# Patient Record
Sex: FEMALE | Race: OTHER
[De-identification: names, ages, dates, MRNs, and addresses within clinical notes are randomized per-mention and may not be internally consistent; named-entity substitution may affect disease eponyms.]

---

## 2023-08-06 ENCOUNTER — HOSPITAL ENCOUNTER (EMERGENCY)
Dept: HOSPITAL 65 - ER | Age: 48
Discharge: HOME | End: 2023-08-06
Payer: COMMERCIAL

## 2023-08-06 VITALS
TEMPERATURE: 99 F | HEIGHT: 66 IN | BODY MASS INDEX: 19.29 KG/M2 | WEIGHT: 120 LBS | SYSTOLIC BLOOD PRESSURE: 113 MMHG | RESPIRATION RATE: 18 BRPM | OXYGEN SATURATION: 97 % | HEART RATE: 83 BPM | DIASTOLIC BLOOD PRESSURE: 76 MMHG

## 2023-08-06 VITALS
SYSTOLIC BLOOD PRESSURE: 119 MMHG | OXYGEN SATURATION: 97 % | TEMPERATURE: 99 F | DIASTOLIC BLOOD PRESSURE: 75 MMHG | RESPIRATION RATE: 18 BRPM | HEART RATE: 77 BPM

## 2023-08-06 VITALS
TEMPERATURE: 99 F | OXYGEN SATURATION: 97 % | SYSTOLIC BLOOD PRESSURE: 104 MMHG | HEART RATE: 65 BPM | DIASTOLIC BLOOD PRESSURE: 69 MMHG | RESPIRATION RATE: 18 BRPM

## 2023-08-06 DIAGNOSIS — F41.9: ICD-10-CM

## 2023-08-06 DIAGNOSIS — N39.0: Primary | ICD-10-CM

## 2023-08-06 DIAGNOSIS — F17.210: ICD-10-CM

## 2023-08-06 DIAGNOSIS — E86.0: ICD-10-CM

## 2023-08-06 DIAGNOSIS — Z98.890: ICD-10-CM

## 2023-08-06 LAB
BASOPHILS # BLD AUTO: 0 10^3/UL (ref 0–0.1)
BASOPHILS NFR BLD AUTO: 0.7 % (ref 0–0.2)
BILIRUB UR STRIP.AUTO-MCNC: NEGATIVE MG/DL
CO2 BLDA-SCNC: 26.4 MMOL/L (ref 20–32)
EOSINOPHIL # BLD AUTO: 0 10^3/UL (ref 0–0.2)
EOSINOPHIL NFR BLD AUTO: 0.2 % (ref 0–5)
ERYTHROCYTE [DISTWIDTH] IN BLOOD BY AUTOMATED COUNT: 12.9 % (ref 11.5–14.5)
GLUCOSE PRE 100 G GLC PO SERPL-MCNC: 103 MG/DL (ref 74–106)
LYMPHOCYTES # BLD AUTO: 1.33 10^3/UL1 (ref 1–4.8)
LYMPHOCYTES NFR BLD AUTO: 22.8 % (ref 24–44)
MCH RBC QN AUTO: 28.9 PG (ref 26–34)
MONOCYTES # BLD AUTO: 0.4 10^3/UL (ref 0.3–0.8)
MONOCYTES NFR BLD AUTO: 7.2 % (ref 5–12)
NEUTROPHILS # BLD AUTO: 4 10^3/UL (ref 1.8–7.7)
NEUTROPHILS NFR BLD AUTO: 68.9 % (ref 41–85)
PLATELET # BLD AUTO: 248 10^3/UL (ref 150–400)
UROBILINOGEN UR QL STRIP.AUTO: 0.2 E.U./DL
YEAST URNS QL MICRO: (no result)

## 2023-08-06 PROCEDURE — 84703 CHORIONIC GONADOTROPIN ASSAY: CPT

## 2023-08-06 PROCEDURE — 96360 HYDRATION IV INFUSION INIT: CPT

## 2023-08-06 PROCEDURE — 99283 EMERGENCY DEPT VISIT LOW MDM: CPT

## 2023-08-06 PROCEDURE — 87077 CULTURE AEROBIC IDENTIFY: CPT

## 2023-08-06 PROCEDURE — 81001 URINALYSIS AUTO W/SCOPE: CPT

## 2023-08-06 PROCEDURE — 87086 URINE CULTURE/COLONY COUNT: CPT

## 2023-08-06 PROCEDURE — 87186 SC STD MICRODIL/AGAR DIL: CPT

## 2023-08-06 PROCEDURE — 36415 COLL VENOUS BLD VENIPUNCTURE: CPT

## 2023-08-06 PROCEDURE — 85730 THROMBOPLASTIN TIME PARTIAL: CPT

## 2023-08-06 PROCEDURE — 85610 PROTHROMBIN TIME: CPT

## 2023-08-06 PROCEDURE — 85025 COMPLETE CBC W/AUTO DIFF WBC: CPT

## 2023-08-06 PROCEDURE — 80053 COMPREHEN METABOLIC PANEL: CPT

## 2023-08-06 PROCEDURE — 85379 FIBRIN DEGRADATION QUANT: CPT

## 2023-08-06 NOTE — ER.PDOC
General


Chief Complaint:  Requesting Medical Care


Stated Complaint:  Weakness


TRAVEL OUT OF US:  No


Time seen by MD:  12:08


Source:  patient, family


Exam Limitations:  no limitations





History of Present Illness


Initial Comments


47 yo F denies particular medical problems or medical history other than 

smoking, anxiety/depression, and prior Caesarian section. Has has insomnia and 

weakness for the past 2-3 days, also poor po intake, while [place]

[place] with her three children ranging from adolescent to pre-K to visit her 

 working in [place] (they live in [place]) and began this 

morning to have some bilateral leg and hand weakness - denying chest pain or 

hyperventilation/dyspnea at the time - which has since resolved. Stopped 

vehicle, called for EMS, comes in [facility] with no obvious deficits.


Timing/Duration:  other (unclear)


Severity:  mild, moderate


Associated Symptoms:  malaise, weakness


Allergies:  


Coded Allergies:  


     No Known Allergies (Unverified , 8/6/23)





Past Medical History


Medical History:  other (anxiety/depression)





Family History


Significant Family History:  no pertinent family hx





Social History


Smoking:  cigarettes


Alcohol Use:  rarely


Drug Use:  none





Reviewed


Nursing Reviewed:  Vital Signs, Abn. Noted, Nursing Assessment





Review of Systems


Constitutional:  malaise, weakness


EENTM:  no symptoms reported


Respiratory:  no symptoms reported; denies shortness of breath


Cardiovascular:  denies chest pain, denies palpitations, denies syncope


Gastrointestinal:  denies abdominal pain, denies diarrhea, denies nausea, denies

vomiting; other (decreased appetite)


Genitourinary:  no symptoms reported


Musculoskeletal:  no symptoms reported


Skin:  no symptoms reported


Psychiatric/Neurological:  anxiety, depressed, tingling (or others)


All Other Systems:  Reviewed and Negative





Physical Exam


General Appearance:  No Apparent Distress, Thin


EENT:  eyes nml inspection


Neck:  Supple


Respiratory:  normal breath sounds


CVS:  reg rate & rhythm


Gastrointestinal:  Normal Bowel Sounds, Non Tender


Extremities:  Normal Range of Motion, Non-Tender


Neurologic/Psychiatric:  No Motor/Sensory Deficits, Alert, Normal Mood/Affect, 

Oriented x 3


Skin:  Normal Color





Results/Orders


Results/Orders





Orders - KRYSTYNA QUINTERO MD


Cbc With Auto Diff (8/6/23 12:08)


Comprehensive Metabolic Panel (8/6/23 12:08)


Urinalysis (8/6/23 12:08)


PT (8/6/23 12:39)


Partial Thromboplastin Time. (8/6/23 12:39)


D-Dimer (8/6/23 12:39)


Hcg Qualitative Serum (8/6/23 12:59)


Urine Culture (8/6/23 12:45)


Potassium Chloride (Klor-Con 10) (8/6/23 13:30)


Sulfamethoxazole/Trimethoprim (Bactrim D (8/6/23 13:09)


0.9 % Sodium Chloride (Ns 1000ml) (8/6/23 13:10)


Potassium Chloride (Klor-Con 10) (8/6/23 13:13)


Sulfamethoxazole/Trimethoprim (Bactrim D (8/6/23 13:13)





Vital Signs








  Date Time  Temp Pulse Resp B/P (MAP) Pulse Ox O2 Delivery O2 Flow Rate FiO2


 


8/6/23 12:49 99.0 77 18 119/75 (90) 97 Room Air* 0 21


 


8/6/23 11:50 99.0 83 18  97   


 


8/6/23 11:50 99.0 83 18 113/76 (88) 97 Room Air* 0 21


 


8/6/23 11:50 99.0 83 18     








                                Laboratory Tests








Test


 8/6/23


12:19 8/6/23


12:45


 


White Blood Count


 5.8 10^3/uL


(4.5-11.0) 





 


Red Blood Count


 4.50 10^6/uL


(4.00-5.20) 





 


Hemoglobin


 13.0 g/dL


(12.0-15.0) 





 


Hematocrit


 39.9 %


(36.0-46.0) 





 


Mean Corpuscular Volume


 88.7 fL


() 





 


Mean Corpuscular Hemoglobin


 28.9 pg


(26-34) 





 


Mean Corpuscular Hemoglobin


Concent 32.6 g/dL


(33-36.5)  L 





 


Red Cell Distribution Width


 12.9 %


(11.5-14.5) 





 


Platelet Count


 248 10^3/uL


(150-400) 





 


Mean Platelet Volume


 10.0 fL


(7.8-11.0) 





 


Neutrophils (%) (Auto)


 68.9 %


(41.0-85.0) 





 


Lymphocytes (%) (Auto)


 22.8 %


(24.0-44.0)  L 





 


Monocytes (%) (Auto)


 7.2 %


(5.0-12.0) 





 


Neutrophils # (Auto)


 4.0 10^3/uL


(1.8-7.7) 





 


Lymphocytes # (Auto)


 1.33 10^3/uL1


(1.0-4.8) 





 


Monocytes # (Auto)


 0.4 10^3/uL


(0.3-0.8) 





 


Absolute Immature Granulocyte


(auto 0.01 10^3 u/L


(0-2) 





 


Absolute Eosinophils (auto)


 0.0 10^3/uL


(0.0-0.2) 





 


Immature Granulocytes %


 0.20 %


(0.00-0.50) 





 


Eosinophils %


 0.2 %


(0.0-5.0) 





 


Basophils %


 0.7 %


(0.0-0.2)  H 





 


Basophils #


 0.0 10^3/uL


(0.0-0.1) 





 


Prothrombin Time


 10.6 SEC


(9.7-11.6) 





 


INR 1.0   


 


Activated Partial


Thromboplast Time 25.0 SEC


(22.5-33.1) 





 


D-Dimer


 0.40 mg/L


(0.19-0.49) 





 


Sodium Level


 141 mmol/L


(132-145) 





 


Potassium Level


 3.2 mmol/L


(3.6-5.2)  L 





 


Chloride Level


 102.0 mmol/L


() 





 


Carbon Dioxide Level


 26.4 mmol/L


(20.0-32) 





 


Anion Gap 15.8   


 


Blood Urea Nitrogen


 4 mg/dL (7-18)


L 





 


Creatinine


 0.70 mg/dL


(0.59-1.40) 





 


Estimated GFR (


American) 109.0 (>/=60)  


 





 


Est GFR (CKD-EPI)(Non-Afr


American) 90.1 (>/=60)  


 





 


BUN/Creatinine Ratio


 5.0


(10.0-20.0)  L 





 


Glucose Level


 103 mg/dL


() 





 


Calcium Level


 9.1 mg/dL


(8.4-10.5) 





 


Total Bilirubin


 0.9 mg/dL


(0.2-1.0) 





 


Aspartate Amino Transferase


(AST) 15 U/L (0-35)  


 





 


Alanine Aminotransferase (ALT)


 18 U/L (12-78)


 





 


Alkaline Phosphatase


 42 U/L


()  L 





 


Total Protein


 7.1 g/dL


(6.4-8.2) 





 


Albumin


 4.0 g/dL


(3.4-5.0) 





 


Globulin 3.1   


 


Albumin/Globulin Ratio 1.290   


 


Serum HCG, Qualitative


 NEGATIVE


(NEGATIVE) 





 


Urine Collection Type  RANDOM  


 


Urine Color  YELLOW  


 


Urine Appearance    


 


Urine Bilirubin


 


 NEGATIVE


(NEGATIVE)


 


Urine Ketones


 


 3+ (NEGATIVE)


H


 


Urine Specific Gravity


 


 1.010


(1.005-1.030)


 


Urine pH  5.5 (4.5-8.0)  


 


Urine Protein


 


 NEGATIVE


(NEGATIVE)


 


Urine Urobilinogen


 


 0.2 E.U./dL


(0.2)


 


Urine Nitrate


 


 POSITIVE


(NEGATIVE)  H


 


Urine Leukocyte Esterase


 


 NEGATIVE


(NEGATIVE)


 


Urine Glucose (Auto)(UA)


 


 NEGATIVE


(NEGATIVE)


 


Urine Blood


 


 2+ (NEGATIVE)


H


 


Urine RBC


 


 2-5 RBC/HPF


(NONE SEEN)


 


Urine WBC


 


 0-2 WBC/HPF


(0-2)


 


Urine Squamous Epithelial


Cells 


 FEW (<=FEW)  





 


Urine Bacteria


 


 MODERATE (NONE


SEEN)  H


 


Urine Yeast


 


 RARE (NONE


SEEN)











Progress


Progress


MDM





One wonders for an anxiety or hyperventilation episode. Abdominal exam is 

benign. We will screen with a d-dimer, check a UA, basic labs.





K is a little low, making me wonder more about a hyperventilation episode.





Looks like some dehydration, anxiety, and UTI. We will give fluids, antibiotics,

and have recommended for outpatient followup. Pt has been in communication with 

her PCP as well and they will adjust her mirtazipine.





ER DEPART


Departure


Time of Disposition:  13:18


Disposition:  01 HOME / SELF CARE / HOMELESS


Impression:  


   Primary Impression:  


   Urinary tract infection


   Additional Impressions:  


   Dehydration


   Anxiety


Condition:  Stable


Patient Instructions:  Anxiety and Panic Attacks, Easy-to-Read, Dehydration, 

Adult, Easy-to-Read, Urinary Tract Infection, Easy-to-Read


Duration or Time Spent with Pa:  15 min





Problem Qualifiers











KRYSTYNA QUINTERO MD                 Aug 6, 2023 12:26

## 2023-08-06 NOTE — NUR
ARRIVAL

PATIENT ARRIVED TO ED4 VIA GURNEY BY [facility], C/O SHORTNESS OF 
BREATH,WEAKNESS,DECREASED APPETITE AND NO SLEEPING FOR THE PAST 2 DAYS, PATIENT 
IS TRAVELING HOME TO [place] FROM [place] AFTER VISITING , HAS 
NOT SLEPT IN 2 DAYS, CALLED EMS NEAR [facility] FOR EVAL, EMS INITIATED A 20G 
TO THE RIGHT AC AND STARTED LACTATED RINGERS AT A WIDE OPEN RATE, BROUGHT TO 
THE ED FOR EVAL, ASSISTED TO ED LACIE, VITAL SIGNS TAKEN AND DOCTOR NOTIFIED 
OF PATIENT'S ARRIVAL.